# Patient Record
Sex: FEMALE | Race: WHITE | URBAN - METROPOLITAN AREA
[De-identification: names, ages, dates, MRNs, and addresses within clinical notes are randomized per-mention and may not be internally consistent; named-entity substitution may affect disease eponyms.]

---

## 2017-01-16 NOTE — PATIENT DISCUSSION
Cataract Counseling: Not visually significant to proceed with surgery at this time. I have discussed continuing with current spectacles vs updating. I have offered the patient a new spectacle prescription to fill if desires. Return to follow up as scheduled or sooner if symptoms arise.

## 2017-09-07 NOTE — PATIENT DISCUSSION
HIGH MYOPIA W/  LATTICE DEGENERATION OS: THINNING IN PERIPHERAL RETINA.  - NO NEED FOR TREATMENT AT THIS TIME. PT TO CALL IF ANY DECREASE IN VISION, INCREASE IN FLOATERS OR FLASHES OF LIGHT. FOLLOW UP AS SCHEDULED.

## 2018-04-24 NOTE — PATIENT DISCUSSION
Continue to see Dr. Danisha Ingram - patient is very nearsighted, call Dr. Danisha Ingram for any new onset flashes/floaters

## 2018-06-08 ENCOUNTER — IMPORTED ENCOUNTER (OUTPATIENT)
Dept: URBAN - METROPOLITAN AREA CLINIC 50 | Facility: CLINIC | Age: 83
End: 2018-06-08

## 2018-06-12 ENCOUNTER — IMPORTED ENCOUNTER (OUTPATIENT)
Dept: URBAN - METROPOLITAN AREA CLINIC 50 | Facility: CLINIC | Age: 83
End: 2018-06-12

## 2018-06-15 ENCOUNTER — IMPORTED ENCOUNTER (OUTPATIENT)
Dept: URBAN - METROPOLITAN AREA CLINIC 50 | Facility: CLINIC | Age: 83
End: 2018-06-15

## 2018-06-15 NOTE — PATIENT DISCUSSION
"""Phaco with IOL OS: 06/19/2018 [place] ZCB00 +23.50 Target: [facility] [place]

## 2018-06-19 ENCOUNTER — IMPORTED ENCOUNTER (OUTPATIENT)
Dept: URBAN - METROPOLITAN AREA CLINIC 50 | Facility: CLINIC | Age: 83
End: 2018-06-19

## 2018-06-19 NOTE — PATIENT DISCUSSION
"""S/P IOL OS: Tecnis ZCB00 +23.50 (Target: [place]) +Femto/Arcs +TM. Continue post operative instructions and drops per schedule.  """

## 2018-06-28 ENCOUNTER — IMPORTED ENCOUNTER (OUTPATIENT)
Dept: URBAN - METROPOLITAN AREA CLINIC 50 | Facility: CLINIC | Age: 83
End: 2018-06-28

## 2018-06-28 NOTE — PATIENT DISCUSSION
"""S/P IOL OS: Tecnis ZCB00 +23.50 (Target: [place]) +Femto/Arcs +TM. Continue post operative instructions and drops per schedule. "" ""Decrease Pred Gati Brom left eye once a day.  (follow schedule)"""

## 2018-07-02 ENCOUNTER — IMPORTED ENCOUNTER (OUTPATIENT)
Dept: URBAN - METROPOLITAN AREA CLINIC 50 | Facility: CLINIC | Age: 83
End: 2018-07-02

## 2018-07-02 NOTE — PATIENT DISCUSSION
"""S/P IOL OD: Tecnis ZCB00 24.50 +Femto/Arcs +TM. Continue post operative instructions and drops per schedule.  """

## 2018-07-12 ENCOUNTER — IMPORTED ENCOUNTER (OUTPATIENT)
Dept: URBAN - METROPOLITAN AREA CLINIC 50 | Facility: CLINIC | Age: 83
End: 2018-07-12

## 2018-08-02 ENCOUNTER — IMPORTED ENCOUNTER (OUTPATIENT)
Dept: URBAN - METROPOLITAN AREA CLINIC 50 | Facility: CLINIC | Age: 83
End: 2018-08-02

## 2018-08-02 NOTE — PATIENT DISCUSSION
"""S/P IOL OU: OD: Tecnis ZCB00 24. 50Femto/Arcs +TM. OS: Tecnis ZCB00 +23.50 (Target: [place])/Arcs +TM. "

## 2018-08-16 ENCOUNTER — IMPORTED ENCOUNTER (OUTPATIENT)
Dept: URBAN - METROPOLITAN AREA CLINIC 50 | Facility: CLINIC | Age: 83
End: 2018-08-16

## 2018-08-27 ENCOUNTER — IMPORTED ENCOUNTER (OUTPATIENT)
Dept: URBAN - METROPOLITAN AREA CLINIC 50 | Facility: CLINIC | Age: 83
End: 2018-08-27

## 2018-08-27 PROBLEM — Z96.1: Noted: 2018-07-02

## 2018-08-27 PROBLEM — Z96.1: Noted: 2018-06-19

## 2018-08-27 PROBLEM — Z98.890: Noted: 2020-08-21

## 2018-09-17 ENCOUNTER — IMPORTED ENCOUNTER (OUTPATIENT)
Dept: URBAN - METROPOLITAN AREA CLINIC 50 | Facility: CLINIC | Age: 83
End: 2018-09-17

## 2018-11-26 ENCOUNTER — IMPORTED ENCOUNTER (OUTPATIENT)
Dept: URBAN - METROPOLITAN AREA CLINIC 50 | Facility: CLINIC | Age: 83
End: 2018-11-26

## 2019-05-14 NOTE — PATIENT DISCUSSION
Continue to see Dr. Anshul Yoo - patient is very nearsighted, call Dr. Anshul Yoo for any new onset flashes/floaters

## 2020-06-18 ENCOUNTER — IMPORTED ENCOUNTER (OUTPATIENT)
Dept: URBAN - METROPOLITAN AREA CLINIC 50 | Facility: CLINIC | Age: 85
End: 2020-06-18

## 2020-06-18 NOTE — PATIENT DISCUSSION
"""nformed patient that their capsular opacification is visually significant and meets the minimum ""

## 2020-07-16 ENCOUNTER — IMPORTED ENCOUNTER (OUTPATIENT)
Dept: URBAN - METROPOLITAN AREA CLINIC 50 | Facility: CLINIC | Age: 85
End: 2020-07-16

## 2020-07-16 NOTE — PATIENT DISCUSSION
"""Insertion of hydro gel punctal plugs RLL and LLL done today with patient written consent."" ""Continue Artificial tears both eyes three times a day

## 2020-08-21 ENCOUNTER — IMPORTED ENCOUNTER (OUTPATIENT)
Dept: URBAN - METROPOLITAN AREA CLINIC 50 | Facility: CLINIC | Age: 85
End: 2020-08-21

## 2020-08-21 NOTE — PATIENT DISCUSSION
"""Informed patient that their capsular opacification is visually significant and meets the minimum criteria for capsulotomy by YAG laser to increase their vision and decrease their glare symptoms. RBAs of procedure discussed.  """

## 2020-09-01 ENCOUNTER — IMPORTED ENCOUNTER (OUTPATIENT)
Dept: URBAN - METROPOLITAN AREA CLINIC 50 | Facility: CLINIC | Age: 85
End: 2020-09-01

## 2020-09-01 NOTE — PATIENT DISCUSSION
"""Continue Theratears both eyes two - three times a day ."" ""Continue HydroEye by mouth once a day . """

## 2021-04-17 ASSESSMENT — VISUAL ACUITY
OD_CC: 20/30-
OS_CC: J1+
OD_CC: J1+
OS_BAT: 20/20
OS_CC: 20/200
OD_SC: 20/25-1
OD_SC: 20/20
OS_SC: 20/25-1
OD_SC: 20/25-1
OS_CC: 20/40
OD_BAT: 20/80
OD_SC: 20/100-1
OS_SC: 20/20
OD_OTHER: 20/20. 20/20.
OS_OTHER: 20/20. 20/30.
OS_SC: 20/400
OS_BAT: 20/100
OD_OTHER: 20/80. 20/200.
OS_SC: 20/25
OD_BAT: 20/20
OD_CC: J1+
OD_OTHER: 20/25. 20/40.
OS_CC: J1+
OD_SC: 20/20
OD_OTHER: 20/80. 20/200.
OS_OTHER: 20/25. 20/30.
OD_SC: 20/30
OS_SC: 20/50
OS_SC: 20/20-1
OS_OTHER: 20/40. >20/400.
OD_CC: J1+
OD_SC: 20/20
OS_SC: 20/20
OD_SC: 20/20-1
OD_SC: 20/20
OS_CC: J1+
OS_OTHER: 20/100. 20/400.
OD_OTHER: 20/20. 20/60.
OD_SC: 20/200+
OS_OTHER: 20/100. 20/400.
OS_BAT: 20/100
OD_SC: 20/25+2
OS_BAT: 20/25
OD_SC: 20/200
OS_CC: J1+
OD_CC: J1+
OD_BAT: 20/80
OS_SC: 20/20
OS_CC: J1+
OD_OTHER: 20/80. 20/200.
OD_BAT: 20/80
OS_BAT: 20/40
OD_CC: J1+
OS_SC: 20/20
OD_BAT: 20/25
OS_SC: 20/20 SLOW
OS_SC: 20/25-1
OD_BAT: 20/20

## 2021-04-17 ASSESSMENT — TONOMETRY
OS_IOP_MMHG: 19
OD_IOP_MMHG: 17
OS_IOP_MMHG: 13
OS_IOP_MMHG: 15
OS_IOP_MMHG: 12
OS_IOP_MMHG: 18
OS_IOP_MMHG: 13
OS_IOP_MMHG: 16
OD_IOP_MMHG: 10
OD_IOP_MMHG: 13
OD_IOP_MMHG: 12
OD_IOP_MMHG: 10
OD_IOP_MMHG: 17
OD_IOP_MMHG: 16
OD_IOP_MMHG: 16
OS_IOP_MMHG: 20
OS_IOP_MMHG: 16
OD_IOP_MMHG: 16
OS_IOP_MMHG: 12
OD_IOP_MMHG: 16
OD_IOP_MMHG: 10
OD_IOP_MMHG: 13
OS_IOP_MMHG: 15
OS_IOP_MMHG: 12
OD_IOP_MMHG: 12
OS_IOP_MMHG: 17

## 2021-06-21 ENCOUNTER — PREPPED CHART (OUTPATIENT)
Dept: URBAN - METROPOLITAN AREA CLINIC 53 | Facility: CLINIC | Age: 86
End: 2021-06-21

## 2021-06-21 NOTE — PATIENT DISCUSSION
"""Continue Theratears both eyes two - three times a day ."" ""Continue HydroEye by mouth once a day . ""."

## 2021-06-24 ENCOUNTER — PROBLEM (OUTPATIENT)
Dept: URBAN - METROPOLITAN AREA CLINIC 53 | Facility: CLINIC | Age: 86
End: 2021-06-24

## 2021-06-24 DIAGNOSIS — H02.834: ICD-10-CM

## 2021-06-24 DIAGNOSIS — H26.491: ICD-10-CM

## 2021-06-24 DIAGNOSIS — H35.373: ICD-10-CM

## 2021-06-24 DIAGNOSIS — H16.223: ICD-10-CM

## 2021-06-24 DIAGNOSIS — H02.831: ICD-10-CM

## 2021-06-24 PROCEDURE — 92134 CPTRZ OPH DX IMG PST SGM RTA: CPT

## 2021-06-24 PROCEDURE — 66821 AFTER CATARACT LASER SURGERY: CPT

## 2021-06-24 PROCEDURE — 92014 COMPRE OPH EXAM EST PT 1/>: CPT

## 2021-06-24 ASSESSMENT — VISUAL ACUITY
OD_GLARE: 20/50
OU_CC: J1+
OS_SC: 20/20
OD_GLARE: 20/30
OD_SC: 20/20-1

## 2021-06-24 ASSESSMENT — TONOMETRY
OD_IOP_MMHG: 10
OS_IOP_MMHG: 12

## 2021-06-24 NOTE — PATIENT DISCUSSION
PCO ([address]): Visually significant PCO present on exam today. Recommend YAG laser capsulotomy to improve vision and decrease glare symptoms. RBAs of procedure discussed. Patient agrees and wishes to proceed.

## 2021-06-24 NOTE — PROCEDURE NOTE: CLINICAL
PROCEDURE NOTE: YAG Capsulotomy OD. Diagnosis: Posterior Capsular Opacification (PCO). Prep: Mydriacil 1% and Phenylephrine 2.5%. Prior to treatment, the risks/benefits/alternatives were discussed. The patient wished to proceed with procedure. Consent was signed. Proparacaine and brominidine were placed into the operative eye after the eye was dilated. Power = 3.0mJ. Number of pulses = *. Patient tolerated procedure well and there were no complications. Post Laser instructions given. Adilia Pressley

## 2021-07-13 ENCOUNTER — YAG POST-OP (OUTPATIENT)
Dept: URBAN - METROPOLITAN AREA CLINIC 49 | Facility: CLINIC | Age: 86
End: 2021-07-13

## 2021-07-13 DIAGNOSIS — Z98.890: ICD-10-CM

## 2021-07-13 PROCEDURE — 99024 POSTOP FOLLOW-UP VISIT: CPT

## 2021-07-13 PROCEDURE — 92015NC REFRACTION NO CHARGE

## 2021-07-13 ASSESSMENT — TONOMETRY
OD_IOP_MMHG: 12
OS_IOP_MMHG: 13

## 2021-07-13 ASSESSMENT — VISUAL ACUITY
OU_CC: J1+
OD_SC: 20/20
OS_SC: 20/20+1

## 2021-07-13 NOTE — PATIENT DISCUSSION
PCO ([address]): Visually significant PCO present on exam today. Recommend YAG laser capsulotomy to improve vision and decrease glare symptoms. RBAs of procedure discussed. Patient agrees and wishes to proceed.

## 2022-08-15 ENCOUNTER — COMPREHENSIVE EXAM (OUTPATIENT)
Dept: URBAN - METROPOLITAN AREA CLINIC 49 | Facility: CLINIC | Age: 87
End: 2022-08-15

## 2022-08-15 DIAGNOSIS — H43.812: ICD-10-CM

## 2022-08-15 DIAGNOSIS — H02.831: ICD-10-CM

## 2022-08-15 DIAGNOSIS — H02.834: ICD-10-CM

## 2022-08-15 DIAGNOSIS — H17.9: ICD-10-CM

## 2022-08-15 DIAGNOSIS — H16.223: ICD-10-CM

## 2022-08-15 DIAGNOSIS — H35.373: ICD-10-CM

## 2022-08-15 PROCEDURE — 92134 CPTRZ OPH DX IMG PST SGM RTA: CPT

## 2022-08-15 PROCEDURE — 92014 COMPRE OPH EXAM EST PT 1/>: CPT

## 2022-08-15 ASSESSMENT — VISUAL ACUITY
OS_SC: 20/20-2
OU_CC: J1+@16"
OD_SC: 20/25-1

## 2022-08-15 ASSESSMENT — TONOMETRY
OD_IOP_MMHG: 09
OS_IOP_MMHG: 10

## 2022-08-15 NOTE — PATIENT DISCUSSION
small linear scar at 3 O'clock position. No complaints of vision complaints at this time. Continue to observe.

## 2022-08-30 NOTE — PATIENT DISCUSSION
Risks, benefits, limitations, and alternatives of cataract extraction discussed with patient, including but not limited to: bleeding, infection, acute or chronic intraocular inflammation, retinal hole/tear/detachment, increased or decreased intraocular pressure, macular edema, corneal edema, posterior capsule opacification, ptosis, irregular pupil, no improvement in vision, worsened vision, need for additional surgery, and death. Patient understands the risks and wishes to take some time and consider moving forward with surgery.

## 2023-08-21 ENCOUNTER — COMPREHENSIVE EXAM (OUTPATIENT)
Dept: URBAN - METROPOLITAN AREA CLINIC 49 | Facility: LOCATION | Age: 88
End: 2023-08-21

## 2023-08-21 DIAGNOSIS — H16.223: ICD-10-CM

## 2023-08-21 DIAGNOSIS — H02.831: ICD-10-CM

## 2023-08-21 DIAGNOSIS — H17.9: ICD-10-CM

## 2023-08-21 DIAGNOSIS — H35.373: ICD-10-CM

## 2023-08-21 DIAGNOSIS — H02.834: ICD-10-CM

## 2023-08-21 DIAGNOSIS — H43.812: ICD-10-CM

## 2023-08-21 PROCEDURE — 92134 CPTRZ OPH DX IMG PST SGM RTA: CPT

## 2023-08-21 PROCEDURE — 99214 OFFICE O/P EST MOD 30 MIN: CPT

## 2023-08-21 ASSESSMENT — VISUAL ACUITY
OS_SC: 20/20-2
OU_CC: J1+
OD_SC: 20/25

## 2023-08-21 ASSESSMENT — TONOMETRY
OD_IOP_MMHG: 10
OS_IOP_MMHG: 10

## 2024-08-26 ENCOUNTER — COMPREHENSIVE EXAM (OUTPATIENT)
Dept: URBAN - METROPOLITAN AREA CLINIC 49 | Facility: LOCATION | Age: 89
End: 2024-08-26

## 2024-08-26 DIAGNOSIS — H16.223: ICD-10-CM

## 2024-08-26 DIAGNOSIS — H35.373: ICD-10-CM

## 2024-08-26 DIAGNOSIS — H17.9: ICD-10-CM

## 2024-08-26 DIAGNOSIS — H43.812: ICD-10-CM

## 2024-08-26 PROCEDURE — 92134 CPTRZ OPH DX IMG PST SGM RTA: CPT

## 2024-08-26 PROCEDURE — 92014 COMPRE OPH EXAM EST PT 1/>: CPT

## 2024-08-26 ASSESSMENT — TONOMETRY
OD_IOP_MMHG: 08
OS_IOP_MMHG: 08

## 2024-08-26 ASSESSMENT — VISUAL ACUITY
OS_SC: 20/20-2
OU_CC: J1+
OD_SC: 20/25